# Patient Record
Sex: MALE | Race: BLACK OR AFRICAN AMERICAN
[De-identification: names, ages, dates, MRNs, and addresses within clinical notes are randomized per-mention and may not be internally consistent; named-entity substitution may affect disease eponyms.]

---

## 2022-05-23 ENCOUNTER — NON-APPOINTMENT (OUTPATIENT)
Age: 77
End: 2022-05-23

## 2022-05-23 ENCOUNTER — APPOINTMENT (OUTPATIENT)
Dept: OTOLARYNGOLOGY | Facility: CLINIC | Age: 77
End: 2022-05-23
Payer: MEDICARE

## 2022-05-23 VITALS
DIASTOLIC BLOOD PRESSURE: 79 MMHG | HEIGHT: 70 IN | SYSTOLIC BLOOD PRESSURE: 171 MMHG | OXYGEN SATURATION: 97 % | TEMPERATURE: 98 F | HEART RATE: 103 BPM

## 2022-05-23 DIAGNOSIS — G47.33 OBSTRUCTIVE SLEEP APNEA (ADULT) (PEDIATRIC): ICD-10-CM

## 2022-05-23 DIAGNOSIS — K21.9 ACUTE LARYNGITIS: ICD-10-CM

## 2022-05-23 DIAGNOSIS — R22.1 LOCALIZED SWELLING, MASS AND LUMP, NECK: ICD-10-CM

## 2022-05-23 DIAGNOSIS — L90.5 SCAR CONDITIONS AND FIBROSIS OF SKIN: ICD-10-CM

## 2022-05-23 DIAGNOSIS — M95.3 ACQUIRED DEFORMITY OF NECK: ICD-10-CM

## 2022-05-23 DIAGNOSIS — R49.9 UNSPECIFIED VOICE AND RESONANCE DISORDER: ICD-10-CM

## 2022-05-23 DIAGNOSIS — Z00.00 ENCOUNTER FOR GENERAL ADULT MEDICAL EXAMINATION W/OUT ABNORMAL FINDINGS: ICD-10-CM

## 2022-05-23 DIAGNOSIS — J04.0 ACUTE LARYNGITIS: ICD-10-CM

## 2022-05-23 PROCEDURE — 99205 OFFICE O/P NEW HI 60 MIN: CPT | Mod: 25

## 2022-05-23 NOTE — CONSULT LETTER
[Dear  ___] : Dear  [unfilled], [Consult Letter:] : I had the pleasure of evaluating your patient, [unfilled]. [Please see my note below.] : Please see my note below. [Consult Closing:] : Thank you very much for allowing me to participate in the care of this patient.  If you have any questions, please do not hesitate to contact me. [Sincerely,] : Sincerely, [FreeTextEntry3] : \par Abiodun Mitchell M.D., FACS, [facility]\par Director [facility] at [facility]\par [facility]\par Certified in Thyroid/Parathyroid/Neck Ultrasound, [facility]/ [facility]\par , Department of Otolaryngology\par [facility] [employer] [facility]\par \par

## 2022-05-23 NOTE — REASON FOR VISIT
[FreeTextEntry2] : a second opinion regarding neck mass and hoarseness s/p neck I & D, tracheostomy and persisitent hoarseness. [FreeTextEntry1] : PCP is Cristina Atkins MD

## 2022-05-23 NOTE — PROCEDURE
[Image(s) Captured] : image(s) captured and filed [Unable to Cooperate with Mirror] : patient unable to cooperate with mirror [Gag Reflex] : gag reflex preventing mirror examination [Hoarseness] : hoarseness not clearly evaluated by indirect laryngoscopy [Topical Lidocaine] : topical lidocaine [Oxymetazoline HCl] : oxymetazoline HCl [Flexible Endoscope] : examined with the flexible endoscope [Serial Number: ___] : Serial Number: [unfilled] [de-identified] : The nasal septum is minimally deviated to the left. There are no masses or polyps and the nasal mucosa and secretions are normal. The choanae and posterior nasopharynx are normal without masses or drainage. The Eustachian tube orifices appear patent. The pharynx, including the posterior and lateral pharyngeal walls, the vallecula and base of tongue are normal without ulcerations, lesions or masses. The uvula is elongated and the upper airway space is narrowed. The hypopharynx including the pyriform sinuses open well without pooling of secretions, mucosal lesions or masses. The supraglottic larynx including the epiglottis, petiole, arytenoids, glossoepiglottic, aryepiglottic and pharyngoepiglottic folds are normal without mucosal lesions, ulcerations or masses. The glottis reveals normal false vocal folds. The true vocal folds are thickened and hyperemic but tense and of equal length, without paralysis, having symmetric mobility on adduction and abduction. There are no mucosal lesions, nodules, cysts, erythroplasia or leukoplakia. The posterior cricoid area has healthy pink mucosa in the interarytenoid area and esophageal inlet. There is moderate thickening/pachydermia of the interarytenoid mucosa suggestive of posterior laryngitis from laryngopharyngeal acid reflux disease. The trachea is clear without narrowing in the immediate subglottic region, without deviation, granulation tissue or lesions.  [de-identified] : s/p intubation for Gagan's Angina and tracheostomy

## 2022-05-23 NOTE — HISTORY OF PRESENT ILLNESS
[de-identified] : Deejay is a 76-year-old retired male s/p I & D neck abscess in Sept 2020 for Gagan's  Angina and sepsis (odontogenic source) in [place], [place].  He was hospitalized for ~ 6 weeks and in intensive care and trached but denies COVID-19 infection.  He survived the infection and is here today for a second opinion regarding a persistent right neck mass and hoarseness.  The mass is painless and has not increased in size or changed since 2020. He is concerned about its appearance and restricts some movement of the neck. He has already been evaluated at [facility] for excision of the mass.  His other co morbidities include EDS, HTN, IDDM, LE venous insufficiency, prostate Ca (treated by EBRT).  He denies fever, body aches, cough, cyanosis, chest burning, anosmia or recent known COVID exposures.  All family members at home are well. He has not been vaccinated.